# Patient Record
Sex: FEMALE | Race: WHITE | NOT HISPANIC OR LATINO | URBAN - METROPOLITAN AREA
[De-identification: names, ages, dates, MRNs, and addresses within clinical notes are randomized per-mention and may not be internally consistent; named-entity substitution may affect disease eponyms.]

---

## 2023-08-29 ENCOUNTER — HOSPITAL ENCOUNTER (OUTPATIENT)
Dept: DATA CONVERSION | Facility: HOSPITAL | Age: 48
Discharge: HOME | End: 2023-08-29

## 2023-08-29 DIAGNOSIS — R31.9 HEMATURIA, UNSPECIFIED: ICD-10-CM

## 2023-08-29 DIAGNOSIS — Z01.419 ENCOUNTER FOR GYNECOLOGICAL EXAMINATION (GENERAL) (ROUTINE) WITHOUT ABNORMAL FINDINGS: ICD-10-CM

## 2023-08-29 DIAGNOSIS — R82.998 OTHER ABNORMAL FINDINGS IN URINE: ICD-10-CM

## 2023-08-30 LAB
AMOXICILLIN+CLAV SUSC ISLT: NORMAL
AMPICILLIN SUSC ISLT: NORMAL
AMPICILLIN+SULBAC SUSC ISLT: NORMAL
BACTERIA ISLT: NORMAL
BACTERIA SPEC CULT: NORMAL
CC # UR: NORMAL /UL
CEFAZOLIN SUSC ISLT: NORMAL
CIPROFLOXACIN SUSC ISLT: NORMAL
GENTAMICIN ISLT MLC: NORMAL
LEVOFLOXACIN SUSC ISLT: NORMAL
MEROPENEM SUSC ISLT: NORMAL
MIC (SUSCEPTIBILITY): NORMAL
NITROFURANTOIN SUSC ISLT: NORMAL
PIP+TAZO SUSC ISLT: NORMAL
REPORT STATUS -LH SQ DATA CONVERSION: NORMAL
SERVICE CMNT-IMP: NORMAL
SPECIMEN SOURCE: NORMAL
TETRACYCLINE SUSC ISLT: NORMAL
TMP SMX SUSC ISLT: NORMAL

## 2023-09-14 ENCOUNTER — HOSPITAL ENCOUNTER (OUTPATIENT)
Dept: DATA CONVERSION | Facility: HOSPITAL | Age: 48
End: 2023-09-14

## 2023-09-14 DIAGNOSIS — Z12.31 ENCOUNTER FOR SCREENING MAMMOGRAM FOR MALIGNANT NEOPLASM OF BREAST: ICD-10-CM

## 2023-09-22 ENCOUNTER — HOSPITAL ENCOUNTER (OUTPATIENT)
Dept: DATA CONVERSION | Facility: HOSPITAL | Age: 48
Discharge: HOME | End: 2023-09-22

## 2023-09-22 DIAGNOSIS — Z12.31 ENCOUNTER FOR SCREENING MAMMOGRAM FOR MALIGNANT NEOPLASM OF BREAST: ICD-10-CM

## 2024-09-06 PROCEDURE — 88175 CYTOPATH C/V AUTO FLUID REDO: CPT

## 2024-09-06 PROCEDURE — 87624 HPV HI-RISK TYP POOLED RSLT: CPT

## 2024-09-10 ENCOUNTER — LAB REQUISITION (OUTPATIENT)
Dept: LAB | Facility: HOSPITAL | Age: 49
End: 2024-09-10
Payer: COMMERCIAL

## 2024-09-10 DIAGNOSIS — Z11.51 ENCOUNTER FOR SCREENING FOR HUMAN PAPILLOMAVIRUS (HPV): ICD-10-CM

## 2024-09-10 DIAGNOSIS — Z01.419 ENCOUNTER FOR GYNECOLOGICAL EXAMINATION (GENERAL) (ROUTINE) WITHOUT ABNORMAL FINDINGS: ICD-10-CM

## 2024-09-10 DIAGNOSIS — Z12.4 ENCOUNTER FOR SCREENING FOR MALIGNANT NEOPLASM OF CERVIX: ICD-10-CM

## 2024-09-12 ENCOUNTER — HOSPITAL ENCOUNTER (OUTPATIENT)
Dept: RADIOLOGY | Facility: HOSPITAL | Age: 49
Discharge: HOME | End: 2024-09-12
Payer: COMMERCIAL

## 2024-09-12 DIAGNOSIS — Z12.31 ENCOUNTER FOR SCREENING MAMMOGRAM FOR MALIGNANT NEOPLASM OF BREAST: ICD-10-CM

## 2024-09-23 ENCOUNTER — HOSPITAL ENCOUNTER (OUTPATIENT)
Dept: RADIOLOGY | Facility: HOSPITAL | Age: 49
Discharge: HOME | End: 2024-09-23
Payer: COMMERCIAL

## 2024-09-23 VITALS — WEIGHT: 131 LBS | HEIGHT: 62 IN | BODY MASS INDEX: 24.11 KG/M2

## 2024-09-23 PROCEDURE — 77063 BREAST TOMOSYNTHESIS BI: CPT | Performed by: RADIOLOGY

## 2024-09-23 PROCEDURE — 77067 SCR MAMMO BI INCL CAD: CPT

## 2024-09-23 PROCEDURE — 77067 SCR MAMMO BI INCL CAD: CPT | Performed by: RADIOLOGY

## 2025-03-05 ENCOUNTER — APPOINTMENT (OUTPATIENT)
Dept: PRIMARY CARE | Facility: CLINIC | Age: 50
End: 2025-03-05
Payer: COMMERCIAL

## 2025-03-05 VITALS
OXYGEN SATURATION: 98 % | SYSTOLIC BLOOD PRESSURE: 116 MMHG | HEART RATE: 74 BPM | WEIGHT: 127 LBS | BODY MASS INDEX: 23.98 KG/M2 | HEIGHT: 61 IN | TEMPERATURE: 98.1 F | DIASTOLIC BLOOD PRESSURE: 74 MMHG

## 2025-03-05 DIAGNOSIS — Z23 ENCOUNTER FOR IMMUNIZATION: Primary | ICD-10-CM

## 2025-03-05 DIAGNOSIS — Z12.11 COLON CANCER SCREENING: ICD-10-CM

## 2025-03-05 DIAGNOSIS — Z00.00 WELL ADULT EXAM: ICD-10-CM

## 2025-03-05 LAB
POC APPEARANCE, URINE: CLEAR
POC BILIRUBIN, URINE: NEGATIVE
POC BLOOD, URINE: ABNORMAL
POC COLOR, URINE: YELLOW
POC GLUCOSE, URINE: NEGATIVE MG/DL
POC KETONES, URINE: NEGATIVE MG/DL
POC LEUKOCYTES, URINE: NEGATIVE
POC NITRITE,URINE: NEGATIVE
POC PH, URINE: 5.5 PH
POC PROTEIN, URINE: NEGATIVE MG/DL
POC SPECIFIC GRAVITY, URINE: 1.02
POC UROBILINOGEN, URINE: 0.2 EU/DL

## 2025-03-05 PROCEDURE — 81003 URINALYSIS AUTO W/O SCOPE: CPT | Performed by: FAMILY MEDICINE

## 2025-03-05 PROCEDURE — 3008F BODY MASS INDEX DOCD: CPT | Performed by: FAMILY MEDICINE

## 2025-03-05 PROCEDURE — 99386 PREV VISIT NEW AGE 40-64: CPT | Performed by: FAMILY MEDICINE

## 2025-03-05 PROCEDURE — 90715 TDAP VACCINE 7 YRS/> IM: CPT | Performed by: FAMILY MEDICINE

## 2025-03-05 PROCEDURE — 93000 ELECTROCARDIOGRAM COMPLETE: CPT | Performed by: FAMILY MEDICINE

## 2025-03-05 PROCEDURE — 90471 IMMUNIZATION ADMIN: CPT | Performed by: FAMILY MEDICINE

## 2025-03-05 PROCEDURE — 1036F TOBACCO NON-USER: CPT | Performed by: FAMILY MEDICINE

## 2025-03-05 RX ORDER — NORGESTIMATE AND ETHINYL ESTRADIOL 7DAYSX3 28
KIT ORAL
COMMUNITY

## 2025-03-05 ASSESSMENT — PATIENT HEALTH QUESTIONNAIRE - PHQ9
2. FEELING DOWN, DEPRESSED OR HOPELESS: NOT AT ALL
SUM OF ALL RESPONSES TO PHQ9 QUESTIONS 1 AND 2: 0
1. LITTLE INTEREST OR PLEASURE IN DOING THINGS: NOT AT ALL

## 2025-03-05 ASSESSMENT — ENCOUNTER SYMPTOMS
LOSS OF SENSATION IN FEET: 0
DEPRESSION: 0
OCCASIONAL FEELINGS OF UNSTEADINESS: 0

## 2025-03-05 ASSESSMENT — PAIN SCALES - GENERAL: PAINLEVEL_OUTOF10: 0-NO PAIN

## 2025-03-05 NOTE — PROGRESS NOTES
Subjective   Patient ID: Tor Yu is a 49 y.o. female who presents for Annual Exam (Mammogram-  9/2024 thru GYN/Pap   done thru GYN/EKG-  due today/Colonoscopy-  needs referral/Tdap - unsure of last booster/Urine today).    HPI   TOR is seen for for her comprehensive physical exam. PMH, PSH, family history and social history were reviewed and updated.   GYN history -.  Sees a Gynecologist.     Review of Systems  Constitutional Symptoms:  She is negative for fever, night sweats, hot flashes, weight loss, Weight loss due to diet, weight gain due to diet, unexpected weight gain, loss of appetite, increased appetite, headaches, fatigue, abnormal activity level, Sleep Disturbance, Recent Illness.   Eyes:  She is negative for blindness, loss and blurring of vision, double vision, swelling, redness, pruritis, eye pain, discharge, dryness of eyes.   Ear, Nose, Mouth, Throat:  She is negative for hearing loss, wearing hearing aids, tinnitus, ear pain, ear drainage, dizziness, allergies, nasal congestion, rhinorrhea, nasal obstruction, post nasal drip, nose bleeds, teeth problems, wearing dentures, mouth sores, gum disease, dysphagia, hoarseness, sore throat, tinnitus, sleep apnea.   Cardiovascular:  She is negative for chest pain/pressure, radiation of pain, palpitations, shortness of breath, dyspnea on exertion, orthopnea, syncope, diaphoresis, cyanosis, edema.   Respiratory:  She is negative for shortness of breath, dyspnea on exertion, coughing, sputum, hemoptysis, wheezing, snoring.   Breast:  She is negative for tenderness, masses, nipple discharge.   Gastrointestinal:  She is negative for anorexia, indigestion, increased belching, food intolerance, use of antacids, nausea, hematemesis, jaundice, abdominal pain, change in bowel habits, diarrhea, constipation, abnormal stools, hematochaezia, melena, blood in stool, increased flatus, hemorrhoids.   Female Genitourinary:  She is negative for stress incontinence, urge  incontinence, dribbling, frequency, nocturia, dysuria, hematuria, recurrent UTIs, hot flashes, decreased libido, Dyspareunia.   Musculoskeletal:  She is negative for joint pain, joint swelling, joint warmth, joint redness, myalgias, cramps, nocturnal muscle cramps, weakness, stiffness, limitation of motion.   Integumentary:  She is negative for change in mole, skin trouble or rash, itching, dryness, loss of hair, dandruff, hirsuitism.   Neurological:  She is negative for headache, speech difficulty, numbness, tingling, weakness, paralysis, tremors, dizziness, syncope, balance problems, memory loss.   Psychiatric:  She is negative for depression, moodiness, anhedonia, change in sleep pattern, disturbing thoughts or feelings, change in libido, suicidal thoughts or attempts, anxiety, panic attacks, obsessive thoughts, compulsions, hyperactivity.   Endocrine:  She is negative for weight gain, heat or cold intolerance, excessive sweating, polydipsia, change in menstural cycle.   Hematologic/Lymphatic:  She is negative for bruising, abnormal bleeding, bleeding gums, nose bleeds, swollen glands.   Objective   /74   Pulse 74   LMP 02/11/2025 (Approximate)   SpO2 98%     Physical Exam  General Appearance: TOR is in no acute distress. She is well nourished, well developed. The patient is awake and alert and appears stated age. TOR is cooperative with exam.   Head: TOR's hair pattern is normal for patients age. The scalp is normal. The skull is normocephalic, atraumatic. The face is unremarkable with no facial droop.   Eyes: PERRLA, EOMI, no scleral icterus.  Normal position and alignment. Eyebrows are normal.   Ears, Nose, Mouth, Throat:   EARS: External bilateral ears reveal normal helix, tragus and ear lobe.  Both canals are normal.  Tympanic membranes are pearly gray, normal landmarks, good light reflex.   MOUTH: Lips are normal with no lesion. Oral mucosa is moist.  Hard and soft palates are normal. Teeth are  in good repair. Tongue reveals is normal. Tonsils are normal with no lesion. Uvula is normal. Posterior pharynx without lesions.   Neck: Inspection of the neck reveals no enlarged nodes, no masses, no JVD, euthyroid. Palpation shows normal pulsation, no adenopathy, or mass.   Inspection reveals normal thyroid gland. Palpation shows normal thyroid gland. with No carotid bruit present.   Anterior cervical lymph node chains are unremarkable. Posterior chains are unremarkable.   Chest: Chest is symmetric. Lungs are clear to auscultation and percussion, no respiratory distress. Breathing is normal.   Cardiovascular: Heart is RRR, S1, S2. No murmurs, rubs or gallops. PMI is normal in left IC space midclavicular line.   Breast: deferred to Gyn.   Abdomen: Abdomen is soft, NT, ND. BS + 4 quadrants. No hepatosplenomegaly.   Genitourinary: Genital/Rectal exam is deferred.   Lymph Nodes: Palpation of the cervical area are within normal limit. Palpation of the axillary area are within normal limit. Palpation of the inguinal area are within normal limit.   Musculoskeletal: Gait is normal. Extremities: No deformity. No cyanosis, clubbing or edema. Range of motion normal. Motor examination reveals normal tone and strength.   Skin: Skin has no bruising, rash, eruptions, or abnormal appearing lesions.   Neurological: Intact and non-focal. Cranial nerves II - XII are grossly intact. Motor exams reveals normal tone and strength , Sensation: normal to touch, position and vibration. DTR: are +2/4 and symmetric at the level of the patella.   Psychiatric: Patient has appropriate judgement. Patient has good insight. Proper orientation to person, place and time. Recent memory is intact. Remote memory is intact. Patient's mood is normal with good eye contact. Affect is appropriate.   Assessment/Plan   Problem List Items Addressed This Visit    None  Visit Diagnoses         Codes    Encounter for immunization    -  Primary needs better  control.  Patient received tetanus immunization today.  She was advised to consider her pharmacy for shingles and influenza immunizations. Z23    Relevant Orders    Tdap vaccine, age 7 years and older  (BOOSTRIX)    Well adult exam    normal exam. Z00.00    Relevant Orders    POCT UA Automated manually resulted (Completed)    CBC and Auto Differential    Comprehensive Metabolic Panel    Lipid Panel    Colon cancer screening    needs referral.  Patient referred to GI for screening colonoscopy. Z12.11    Relevant Orders    Referral to Gastroenterology